# Patient Record
Sex: MALE | Race: WHITE | Employment: FULL TIME | URBAN - NONMETROPOLITAN AREA
[De-identification: names, ages, dates, MRNs, and addresses within clinical notes are randomized per-mention and may not be internally consistent; named-entity substitution may affect disease eponyms.]

---

## 2020-11-09 ENCOUNTER — OFFICE VISIT (OUTPATIENT)
Dept: FAMILY MEDICINE CLINIC | Age: 25
End: 2020-11-09
Payer: COMMERCIAL

## 2020-11-09 ENCOUNTER — TELEPHONE (OUTPATIENT)
Dept: FAMILY MEDICINE CLINIC | Age: 25
End: 2020-11-09

## 2020-11-09 ENCOUNTER — HOSPITAL ENCOUNTER (OUTPATIENT)
Dept: GENERAL RADIOLOGY | Age: 25
Discharge: HOME | End: 2020-11-09
Payer: COMMERCIAL

## 2020-11-09 ENCOUNTER — HOSPITAL ENCOUNTER (OUTPATIENT)
Age: 25
Discharge: HOME | End: 2020-11-09
Payer: COMMERCIAL

## 2020-11-09 VITALS
OXYGEN SATURATION: 99 % | TEMPERATURE: 98.1 F | HEIGHT: 70 IN | SYSTOLIC BLOOD PRESSURE: 128 MMHG | WEIGHT: 217.6 LBS | DIASTOLIC BLOOD PRESSURE: 88 MMHG | HEART RATE: 94 BPM | BODY MASS INDEX: 31.15 KG/M2

## 2020-11-09 PROCEDURE — 73030 X-RAY EXAM OF SHOULDER: CPT

## 2020-11-09 PROCEDURE — 71101 X-RAY EXAM UNILAT RIBS/CHEST: CPT

## 2020-11-09 PROCEDURE — 99203 OFFICE O/P NEW LOW 30 MIN: CPT | Performed by: NURSE PRACTITIONER

## 2020-11-09 RX ORDER — NAPROXEN 500 MG/1
500 TABLET ORAL 2 TIMES DAILY WITH MEALS
Qty: 60 TABLET | Refills: 3 | Status: SHIPPED | OUTPATIENT
Start: 2020-11-09 | End: 2021-01-15 | Stop reason: SINTOL

## 2020-11-09 RX ORDER — PREDNISONE 50 MG/1
50 TABLET ORAL DAILY
Qty: 5 TABLET | Refills: 0 | Status: SHIPPED | OUTPATIENT
Start: 2020-11-09 | End: 2020-11-14

## 2020-11-09 ASSESSMENT — PATIENT HEALTH QUESTIONNAIRE - PHQ9
SUM OF ALL RESPONSES TO PHQ QUESTIONS 1-9: 0
SUM OF ALL RESPONSES TO PHQ9 QUESTIONS 1 & 2: 0
2. FEELING DOWN, DEPRESSED OR HOPELESS: 0
1. LITTLE INTEREST OR PLEASURE IN DOING THINGS: 0

## 2020-11-09 ASSESSMENT — ENCOUNTER SYMPTOMS
DIARRHEA: 0
ORTHOPNEA: 0
BACK PAIN: 0
CHEST TIGHTNESS: 0
APNEA: 0
FACIAL SWELLING: 0
SORE THROAT: 0
ABDOMINAL DISTENTION: 0
EYE PAIN: 0
SHORTNESS OF BREATH: 1
EYE DISCHARGE: 0
NAUSEA: 0
PHOTOPHOBIA: 0
CONSTIPATION: 0

## 2020-11-09 NOTE — PROGRESS NOTES
[id number] [facility]  [address] [place] [id number]  Dept: [phone]  Dept Fax: : [phone]     Visit Date:  11/9/2020    Patient:  Eliana Mclaughlin  YOB: 1995    HPI:     Chief Complaint   Patient presents with    New Patient     Establish Care    Chest Pain     x 8 months, has seen a chiropractor with no relief. Chest Pain    This is a new problem. The current episode started more than 1 month ago (8 months ago). The onset quality is gradual. The problem occurs daily. The problem has been gradually worsening. The pain is present in the substernal region and lateral region (8 months). Pain scale: at rest at a 2; with movement or working 8. The pain is moderate. The quality of the pain is described as sharp, stabbing, tearing, burning and tightness. The pain radiates to the right arm and right shoulder (right arm dominent, threw with the right arm. ). Associated symptoms include shortness of breath ( due to pain). Pertinent negatives include no back pain, diaphoresis, dizziness, fever, headaches, nausea, numbness, orthopnea or weakness. Here to establish care  Used to see Dr Sreedhar Gonzalez  Hx of seasonal allergies  Takes claritin  Works at [employer] as a . No concussions  No fractures. Sports in highschool, played basketball and baseball  Continues to weight lift. No previous medical history. Normally feels well  Sleep is good  6-8 hours per nght. Medications    Current Outpatient Medications:     predniSONE (DELTASONE) 50 MG tablet, Take 1 tablet by mouth daily for 5 days, Disp: 5 tablet, Rfl: 0    naproxen (NAPROSYN) 500 MG tablet, Take 1 tablet by mouth 2 times daily (with meals), Disp: 60 tablet, Rfl: 3    The patient has No Known Allergies. Past Medical History  Lamberto  has no past medical history on file.     Past Surgical History  The patient  has a past surgical history that includes Tonsillectomy and Adenoidectomy. Family History  This patient's family history is not on file. Social History  Edith Sequeira  reports that he has never smoked. He has never used smokeless tobacco. He reports that he does not drink alcohol. Health Maintenance:    Health Maintenance   Topic Date Due    Varicella vaccine (1 of 2 - 2-dose childhood series) 12/31/1998    HPV vaccine (1 - Male 2-dose series) 12/31/2008    HIV screen  12/31/2012    Flu vaccine (1) 09/01/2020    DTaP/Tdap/Td vaccine (3 - Td) 07/29/2030    Hepatitis A vaccine  Aged Out    Hepatitis B vaccine  Aged Out    Hib vaccine  Aged Out    Meningococcal (ACWY) vaccine  Aged Out    Pneumococcal 0-64 years Vaccine  Aged Out       Subjective:      Review of Systems   Constitutional: Negative for activity change, appetite change, diaphoresis, fatigue and fever. HENT: Negative for congestion, facial swelling, postnasal drip and sore throat. Eyes: Negative for photophobia, pain, discharge and visual disturbance. Respiratory: Positive for shortness of breath ( due to pain). Negative for apnea and chest tightness. Cardiovascular: Positive for chest pain. Negative for orthopnea and leg swelling. Gastrointestinal: Negative for abdominal distention, constipation, diarrhea and nausea. Endocrine: Negative for cold intolerance. Genitourinary: Negative for dysuria, flank pain, frequency and urgency. Musculoskeletal: Negative for arthralgias, back pain and myalgias. Skin: Negative for pallor and rash. Allergic/Immunologic: Negative for environmental allergies and food allergies. Neurological: Negative for dizziness, weakness, numbness and headaches. Hematological: Negative for adenopathy. Does not bruise/bleed easily. Psychiatric/Behavioral: Negative for agitation, confusion and suicidal ideas.        Objective:     /88 (Site: Left Upper Arm, Position: Sitting, Cuff Size: Medium Adult)   Pulse 94   Temp 98.1 °F (36.7 °C) (Temporal)   Ht 5' 10.2\" (1.783 m)   Wt 217 lb 9.6 oz (98.7 kg)   SpO2 99%   BMI 31.04 kg/m²     Physical Exam  Vitals signs and nursing note reviewed. Constitutional:       General: He is not in acute distress. Appearance: Normal appearance. He is well-developed and normal weight. He is not ill-appearing. HENT:      Head: Normocephalic. Right Ear: Tympanic membrane, ear canal and external ear normal.      Left Ear: Tympanic membrane, ear canal and external ear normal.      Nose: Nose normal.      Mouth/Throat:      Mouth: Mucous membranes are moist.      Pharynx: No posterior oropharyngeal erythema. Eyes:      General: No scleral icterus. Right eye: No discharge. Left eye: No discharge. Conjunctiva/sclera: Conjunctivae normal.   Neck:      Musculoskeletal: Normal range of motion and neck supple. No muscular tenderness. Cardiovascular:      Rate and Rhythm: Normal rate and regular rhythm. Pulses: Normal pulses. Heart sounds: Normal heart sounds. Pulmonary:      Effort: Pulmonary effort is normal.      Breath sounds: Normal breath sounds. No wheezing or rales. Chest:      Chest wall: Deformity and tenderness present. No edema. There is no dullness to percussion. Breasts: Breasts are symmetrical.       Abdominal:      General: Bowel sounds are normal. There is no distension. Palpations: Abdomen is soft. Tenderness: There is no abdominal tenderness. Musculoskeletal: Normal range of motion. General: No tenderness. Lymphadenopathy:      Cervical: No cervical adenopathy. Skin:     General: Skin is warm and dry. Capillary Refill: Capillary refill takes less than 2 seconds. Findings: No erythema or rash. Neurological:      General: No focal deficit present. Mental Status: He is alert and oriented to person, place, and time. Mental status is at baseline.    Psychiatric:         Behavior: Behavior normal. Thought Content: Thought content normal.         Judgment: Judgment normal.         Labs Reviewed 11/9/2020:    No results found for: WBC, HGB, HCT, PLT, CHOL, TRIG, HDL, LDLDIRECT, ALT, AST, NA, K, CL, CREATININE, BUN, CO2, TSH, PSA, INR, GLUF, LABA1C, LABMICR    /Plan    I have reviewed the patient's medical history in detail and updated the computerized patient record. HPI? ROS per the patient. 8 month hx right sided chest pain after lifting a weight overhead. There is a right clavicular deformity. Pain mid chest right side, radiates up into the shoulder. Right distal end of clavicle appears to be mildly thickened. Pt will be started on anti inflammatories, gentle stretches given to the patient. And follow up in 4 weeks. May need a PT referral. Would suspect a cartilaginous injury/pleaurisy. Pt agreeable to plan of care. 1. Right-sided chest wall pain    - XR RIBS RIGHT INCLUDE CHEST (MIN 3 VIEWS); Future  - XR SHOULDER RIGHT (MIN 2 VIEWS); Future  - predniSONE (DELTASONE) 50 MG tablet; Take 1 tablet by mouth daily for 5 days  Dispense: 5 tablet; Refill: 0  - naproxen (NAPROSYN) 500 MG tablet; Take 1 tablet by mouth 2 times daily (with meals)  Dispense: 60 tablet; Refill: 3    2. Pain of right clavicle    - XR RIBS RIGHT INCLUDE CHEST (MIN 3 VIEWS); Future  - XR SHOULDER RIGHT (MIN 2 VIEWS); Future  - predniSONE (DELTASONE) 50 MG tablet; Take 1 tablet by mouth daily for 5 days  Dispense: 5 tablet; Refill: 0  - naproxen (NAPROSYN) 500 MG tablet; Take 1 tablet by mouth 2 times daily (with meals)  Dispense: 60 tablet; Refill: 3    3. Skin irritation from shaving        Return in about 6 months (around 5/9/2021), or if symptoms worsen or fail to improve, for Medication evaluation, Medication refill, Results review. Patient given educational materials - see patientinstructions. Discussed use, benefit, and side effects of prescribed medications. All patient questions answered. Pt voiced understanding.

## 2020-11-09 NOTE — PATIENT INSTRUCTIONS
Patient Education        Musculoskeletal Chest Pain: Care Instructions  Your Care Instructions     Chest pain is not always a sign that something is wrong with your heart or that you have another serious problem. The doctor thinks your chest pain is caused by strained muscles or ligaments, inflamed chest cartilage, or another problem in your chest, rather than by your heart. You may need more tests to find the cause of your chest pain. Follow-up care is a key part of your treatment and safety. Be sure to make and go to all appointments, and call your doctor if you are having problems. It's also a good idea to know your test results and keep a list of the medicines you take. How can you care for yourself at home? · Take pain medicines exactly as directed. ? If the doctor gave you a prescription medicine for pain, take it as prescribed. ? If you are not taking a prescription pain medicine, ask your doctor if you can take an over-the-counter medicine. · Rest and protect the sore area. · Stop, change, or take a break from any activity that may be causing your pain or soreness. · Put ice or a cold pack on the sore area for 10 to 20 minutes at a time. Try to do this every 1 to 2 hours for the next 3 days (when you are awake) or until the swelling goes down. Put a thin cloth between the ice and your skin. · After 2 or 3 days, apply a heating pad set on low or a warm cloth to the area that hurts. Some doctors suggest that you go back and forth between hot and cold. · Do not wrap or tape your ribs for support. This may cause you to take smaller breaths, which could increase your risk of lung problems. · Mentholated creams such as Bengay or Icy Hot may soothe sore muscles. Follow the instructions on the package. · Follow your doctor's instructions for exercising. · Gentle stretching and massage may help you get better faster.  Stretch slowly to the point just before pain begins, and hold the stretch for at least 15 to 30 seconds. Do this 3 or 4 times a day. Stretch just after you have applied heat. · As your pain gets better, slowly return to your normal activities. Any increased pain may be a sign that you need to rest a while longer. When should you call for help? Call 911 anytime you think you may need emergency care. For example, call if:    · You have chest pain or pressure. This may occur with:  ? Sweating. ? Shortness of breath. ? Nausea or vomiting. ? Pain that spreads from the chest to the neck, jaw, or one or both shoulders or arms. ? Dizziness or lightheadedness. ? A fast or uneven pulse. After calling 911, chew 1 adult-strength aspirin. Wait for an ambulance. Do not try to drive yourself.     · You have sudden chest pain and shortness of breath, or you cough up blood. Call your doctor now or seek immediate medical care if:    · You have any trouble breathing.     · Your chest pain gets worse.     · Your chest pain occurs consistently with exercise and is relieved by rest.   Watch closely for changes in your health, and be sure to contact your doctor if:    · Your chest pain does not get better after 1 week. Where can you learn more? Go to https://Z[employer] Corporation.LightSail Education. org and sign in to your [employer] Crowd account. Enter 4111 [id number] in the [facility] box to learn more about \"Musculoskeletal Chest Pain: Care Instructions. \"     If you do not have an account, please click on the \"Sign Up Now\" link. Current as of: June 26, 2019               Content Version: 12.6  © 7171-0050 [email or web address], Incorporated. Care instructions adapted under license by [facility] ([facility]). If you have questions about a medical condition or this instruction, always ask your healthcare professional. [place] [address] any warranty or liability for your use of this information.          Patient Education        Collarbone Fracture: Rehab Exercises  Introduction  Here are some examples of exercises for you to try. The exercises may be suggested for a condition or for rehabilitation. Start each exercise slowly. Ease off the exercises if you start to have pain. You will be told when to start these exercises and which ones will work best for you. How to do the exercises  Shoulder blade squeeze   1. While standing with your arms at your sides, squeeze your shoulder blades together. Do not raise your shoulders up as you are squeezing. 2. Hold 6 seconds. 3. Repeat 8 to 12 times. Wall angels   1. Start this exercise with your back against a wall and your hands raised above your head. 2. Keeping your arms against the wall, bend your elbows and slowly lower your arms while squeezing your shoulder blades together. 3. Repeat 8 to 12 times. Shoulder flexion (lying down)   To make a wand for this exercise, use a piece of PVC pipe or a broom handle with the broom removed. Make the wand about a foot wider than your shoulders. 1. Lie on your back, holding a wand with both hands. Your palms should face down as you hold the wand. 2. Keep your elbows straight, and slowly raise your arms over your head until you feel a stretch in your shoulders, upper back, and chest.  3. Hold for 15 to 30 seconds. 4. Repeat 2 to 4 times. Chest stretch (lying down)   1. Lie on your back with your elbows bent. Your arms should be out to your sides, and your arms and elbows should be resting on the surface you are lying on, such as the floor. 2. Raise your hands above your head until you feel a stretch in your chest.  3. Hold for 15 to 30 seconds. 4. Repeat 2 to 4 times. Follow-up care is a key part of your treatment and safety. Be sure to make and go to all appointments, and call your doctor if you are having problems. It's also a good idea to know your test results and keep a list of the medicines you take. Where can you learn more? Go to https://milton.Greencloud Technologies. org and sign in to your OpenSpark account.  Enter X100 in the Search Health Information box to learn more about \"Collarbone Fracture: Rehab Exercises. \"     If you do not have an account, please click on the \"Sign Up Now\" link. Current as of: March 2, 2020               Content Version: 12.6  © 6458-4616 [employer], Incorporated. Care instructions adapted under license by [facility] ([facility]). If you have questions about a medical condition or this instruction, always ask your healthcare professional. [address] any warranty or liability for your use of this information. Patient Education        Well Visit, Ages 25 to 48: Care Instructions  Your Care Instructions     Physical exams can help you stay healthy. Your doctor has checked your overall health and may have suggested ways to take good care of yourself. He or she also may have recommended tests. At home, you can help prevent illness with healthy eating, regular exercise, and other steps. Follow-up care is a key part of your treatment and safety. Be sure to make and go to all appointments, and call your doctor if you are having problems. It's also a good idea to know your test results and keep a list of the medicines you take. How can you care for yourself at home? · Reach and stay at a healthy weight. This will lower your risk for many problems, such as obesity, diabetes, heart disease, and high blood pressure. · Get at least 30 minutes of physical activity on most days of the week. Walking is a good choice. You also may want to do other activities, such as running, swimming, cycling, or playing tennis or team sports. Discuss any changes in your exercise program with your doctor. · Do not smoke or allow others to smoke around you. If you need help quitting, talk to your doctor about stop-smoking programs and medicines. These can increase your chances of quitting for good. · Talk to your doctor about whether you have any risk factors for sexually transmitted infections (STIs).  Having one sex partner (who does not have STIs and does not have sex with anyone else) is a good way to avoid these infections. · Use birth control if you do not want to have children at this time. Talk with your doctor about the choices available and what might be best for you. · Protect your skin from too much sun. When you're outdoors from 10 a.m. to 4 p.m., stay in the shade or cover up with clothing and a hat with a wide brim. Wear sunglasses that block UV rays. Even when it's cloudy, put broad-spectrum sunscreen (SPF 30 or higher) on any exposed skin. · See a dentist one or two times a year for checkups and to have your teeth cleaned. · Wear a seat belt in the car. Follow your doctor's advice about when to have certain tests. These tests can spot problems early. For everyone  · Cholesterol. Have the fat (cholesterol) in your blood tested after age 21. Your doctor will tell you how often to have this done based on your age, family history, or other things that can increase your risk for heart disease. · Blood pressure. Have your blood pressure checked during a routine doctor visit. Your doctor will tell you how often to check your blood pressure based on your age, your blood pressure results, and other factors. · Vision. Talk with your doctor about how often to have a glaucoma test.  · Diabetes. Ask your doctor whether you should have tests for diabetes. · Colon cancer. Your risk for colorectal cancer gets higher as you get older. Some experts say that adults should start regular screening at age 48 and stop at age 76. Others say to start before age 48 or continue after age 76. Talk with your doctor about your risk and when to start and stop screening. For women  · Breast exam and mammogram. Talk to your doctor about when you should have a clinical breast exam and a mammogram. Medical experts differ on whether and how often women under 50 should have these tests.  Your doctor can help you decide what is right for you.  · Cervical cancer screening test and pelvic exam. Begin with a Pap test at age 24. The test often is part of a pelvic exam. Starting at age 27, you may choose to have a Pap test, an HPV test, or both tests at the same time (called co-testing). Talk with your doctor about how often to have testing. · Tests for sexually transmitted infections (STIs). Ask whether you should have tests for STIs. You may be at risk if you have sex with more than one person, especially if your partners do not wear condoms. For men  · Tests for sexually transmitted infections (STIs). Ask whether you should have tests for STIs. You may be at risk if you have sex with more than one person, especially if you do not wear a condom. · Testicular cancer exam. Ask your doctor whether you should check your testicles regularly. · Prostate exam. Talk to your doctor about whether you should have a blood test (called a PSA test) for prostate cancer. Experts differ on whether and when men should have this test. Some experts suggest it if you are older than 39 and are -American or have a father or brother who got prostate cancer when he was younger than 72. When should you call for help? Watch closely for changes in your health, and be sure to contact your doctor if you have any problems or symptoms that concern you. Where can you learn more? Go to https://BioTrace Medical.healthJamii. org and sign in to your [employer] account. Enter P072 in the [facility] box to learn more about \"Well Visit, Ages 25 to 48: Care Instructions. \"     If you do not have an account, please click on the \"Sign Up Now\" link. Current as of: May 27, 2020               Content Version: 12.6  © 2469-1931 [employer], Incorporated. Care instructions adapted under license by [facility] [employer]station[employer] [facility] ([facility]).  If you have questions about a medical condition or this instruction, always ask your healthcare professional. Jigna Schwarz disclaims any warranty or liability for your use of this information.

## 2020-11-10 NOTE — TELEPHONE ENCOUNTER
I called the patient and received voicemail. I left a detailed message regarding Devika's response regarding his should and rib x rays and if he had any questions he can give the office a call back.

## 2021-01-13 ENCOUNTER — PATIENT MESSAGE (OUTPATIENT)
Dept: FAMILY MEDICINE CLINIC | Age: 26
End: 2021-01-13

## 2021-01-13 DIAGNOSIS — R06.02 SOB (SHORTNESS OF BREATH): ICD-10-CM

## 2021-01-13 DIAGNOSIS — R07.89 RIGHT-SIDED CHEST WALL PAIN: Primary | ICD-10-CM

## 2021-01-15 DIAGNOSIS — R07.1 CHEST PAIN MADE WORSE BY BREATHING: ICD-10-CM

## 2021-01-15 DIAGNOSIS — M89.8X1 PAIN OF RIGHT CLAVICLE: ICD-10-CM

## 2021-01-15 DIAGNOSIS — R07.89 RIGHT-SIDED CHEST WALL PAIN: Primary | ICD-10-CM

## 2021-01-15 NOTE — TELEPHONE ENCOUNTER
From: Ayde Cordon  To: JULIET Lewis - CNP  Sent: 1/13/2021 6:27 PM EST  Subject: Non-Urgent Medical Question    Hi Dr Venancio Enriquez been taking the naproxin and my chest continues to have pain daily. I have not been working out or doing anything outside of work and am seeing no improvement. Also the past couple weeks I have had a tougher time breathing, especially after eating. I have always had bad allergies but nothing like this before. I would like to get both taken care of so let me know what you think.  Thank you     Ashvin Kim

## 2021-01-16 ENCOUNTER — NURSE ONLY (OUTPATIENT)
Dept: LAB | Age: 26
End: 2021-01-16

## 2021-01-16 DIAGNOSIS — M89.8X1 PAIN OF RIGHT CLAVICLE: ICD-10-CM

## 2021-01-16 DIAGNOSIS — R07.89 RIGHT-SIDED CHEST WALL PAIN: ICD-10-CM

## 2021-01-16 DIAGNOSIS — R06.02 SOB (SHORTNESS OF BREATH): ICD-10-CM

## 2021-01-16 DIAGNOSIS — R07.1 CHEST PAIN MADE WORSE BY BREATHING: ICD-10-CM

## 2021-01-16 LAB
ALBUMIN SERPL-MCNC: 4.5 G/DL (ref 3.5–5.1)
ALP BLD-CCNC: 66 U/L (ref 38–126)
ALT SERPL-CCNC: 25 U/L (ref 11–66)
ANION GAP SERPL CALCULATED.3IONS-SCNC: 8 MEQ/L (ref 8–16)
AST SERPL-CCNC: 22 U/L (ref 5–40)
BASOPHILS # BLD: 0.4 %
BASOPHILS ABSOLUTE: 0 THOU/MM3 (ref 0–0.1)
BILIRUB SERPL-MCNC: 0.6 MG/DL (ref 0.3–1.2)
BUN BLDV-MCNC: 19 MG/DL (ref 7–22)
C-REACTIVE PROTEIN: 0.4 MG/DL (ref 0–1)
CALCIUM SERPL-MCNC: 9.5 MG/DL (ref 8.5–10.5)
CHLORIDE BLD-SCNC: 107 MEQ/L (ref 98–111)
CO2: 25 MEQ/L (ref 23–33)
CREAT SERPL-MCNC: 1.1 MG/DL (ref 0.4–1.2)
EOSINOPHIL # BLD: 6.4 %
EOSINOPHILS ABSOLUTE: 0.3 THOU/MM3 (ref 0–0.4)
ERYTHROCYTE [DISTWIDTH] IN BLOOD BY AUTOMATED COUNT: 11.9 % (ref 11.5–14.5)
ERYTHROCYTE [DISTWIDTH] IN BLOOD BY AUTOMATED COUNT: 39.5 FL (ref 35–45)
GFR SERPL CREATININE-BSD FRML MDRD: 83 ML/MIN/1.73M2
GLUCOSE BLD-MCNC: 87 MG/DL (ref 70–108)
HCT VFR BLD CALC: 46.4 % (ref 42–52)
HEMOGLOBIN: 16.1 GM/DL (ref 14–18)
IMMATURE GRANS (ABS): 0.01 THOU/MM3 (ref 0–0.07)
IMMATURE GRANULOCYTES: 0.2 %
LYMPHOCYTES # BLD: 45.8 %
LYMPHOCYTES ABSOLUTE: 2.1 THOU/MM3 (ref 1–4.8)
MCH RBC QN AUTO: 31.1 PG (ref 26–33)
MCHC RBC AUTO-ENTMCNC: 34.7 GM/DL (ref 32.2–35.5)
MCV RBC AUTO: 89.6 FL (ref 80–94)
MONOCYTES # BLD: 9 %
MONOCYTES ABSOLUTE: 0.4 THOU/MM3 (ref 0.4–1.3)
NUCLEATED RED BLOOD CELLS: 0 /100 WBC
PLATELET # BLD: 238 THOU/MM3 (ref 130–400)
PMV BLD AUTO: 10.4 FL (ref 9.4–12.4)
POTASSIUM SERPL-SCNC: 4.3 MEQ/L (ref 3.5–5.2)
RBC # BLD: 5.18 MILL/MM3 (ref 4.7–6.1)
SEDIMENTATION RATE, ERYTHROCYTE: 5 MM/HR (ref 0–10)
SEG NEUTROPHILS: 38.2 %
SEGMENTED NEUTROPHILS ABSOLUTE COUNT: 1.7 THOU/MM3 (ref 1.8–7.7)
SODIUM BLD-SCNC: 140 MEQ/L (ref 135–145)
TOTAL PROTEIN: 7.5 G/DL (ref 6.1–8)
TSH SERPL DL<=0.05 MIU/L-ACNC: 1.2 UIU/ML (ref 0.4–4.2)
WBC # BLD: 4.5 THOU/MM3 (ref 4.8–10.8)

## 2021-01-18 ENCOUNTER — TELEPHONE (OUTPATIENT)
Dept: FAMILY MEDICINE CLINIC | Age: 26
End: 2021-01-18

## 2021-01-18 NOTE — TELEPHONE ENCOUNTER
I would see how therapy does.  Insurance usually requires you try and fail at least 4 weeks of therapy before approving MRI>

## 2021-01-18 NOTE — TELEPHONE ENCOUNTER
----- Message from JULIET Mann CNP sent at 1/18/2021 10:33 AM EST -----  Labs are all within normal limits. Does appear he may have had some viral illness, but it was not covid. covid test was negative. Would suspect the chest pain is coming from a nerve impingement.

## 2021-01-18 NOTE — TELEPHONE ENCOUNTER
I called and spoke to the patient. I let him know Devika's response. The patient voiced understanding.

## 2021-01-21 ENCOUNTER — HOSPITAL ENCOUNTER (OUTPATIENT)
Dept: OCCUPATIONAL THERAPY | Age: 26
Setting detail: THERAPIES SERIES
Discharge: HOME | End: 2021-01-21
Payer: COMMERCIAL

## 2021-01-21 LAB — EPSTEIN BARR VIRUS BY PCR (BLOOD): NORMAL

## 2021-01-21 PROCEDURE — 97110 THERAPEUTIC EXERCISES: CPT

## 2021-01-21 PROCEDURE — 97165 OT EVAL LOW COMPLEX 30 MIN: CPT

## 2021-01-21 NOTE — PROGRESS NOTES
** PLEASE SIGN, DATE AND TIME CERTIFICATION BELOW AND RETURN TO [facility] OUTPATIENT REHABILITATION (FAX #: [phone]). ATTEST/CO-SIGN IF ACCESSING VIA [employer]. THANK YOU.**    I certify that I have examined the patient below and determined that Physical Medicine and Rehabilitation service is necessary and that I approve the established plan of care for up to 90 days or as specifically noted. Attestation, signature or co-signature of physician indicates approval of certification requirements.    ________________________ ____________ __________  Physician Signature   Date   Time   [address]  [x] EVALUATION  [] DAILY NOTE (LAND) [] DAILY NOTE (AQUATIC ) [] PROGRESS NOTE [] DISCHARGE NOTE    [x] 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 States right side of his chest, clavicle, and shoulder have been hurting for months. Stated he originally thought it was his ribs, so he went to he chiropractor for weeks but this did not help. Pt.  Then went to the doctor and xrays on 11/9/20 of his chest and right shoulder were normal. Pt. States he used to weight lift but has not done this in the past year due to his pain. Pt. 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 SUBJECTIVE: Pt. States his right shoulder/chest does not hurt all the time, but when it does, it's 7/10 pain. States it doesn't seem to be triggered by lifting. Points to middle of right pec muscle for primary pain. Social/Functional History:  Electronic Medical Record reviewed and up to date    Hussein Ferguson lives with spouse   Task Prior Level of Function  (current level of function addressed below)   ADLs  Independent   Ambulation Independent   Transfers Independent   Hobbies Exercise, work on his house   Driving Active    Work [employer]. Occupation: works for [employer]:  Hand Dominance right handed   Palpation Pt. Not tender over pec insertion. Observation Right scapula is slightly forward compared to left. Posture Right shoulder slightly forward   Edema    Special Tests        ADL's Pt. States he is able to do all his self care and work activities, but at times his right pec muscle will hurt when driving (arms out front and turning large steering wheel), not able to lift weights since this pain started   Bed Mobility     Transfers    Balance        Sensation Right Upper Extremity: Intact. Coordination WNL           RIGHT UPPER EXTREMITY  RANGE OF MOTION    AROM PROM COMMENTS         Shoulder Flexion 160  This is equal to left shoulder flexion   Shoulder Extension 50     Shoulder Abduction 160  Equal to left Abd. Shoulder Adduction      Shoulder External Rotation 75  Equal to left ER   Shoulder Internal Rotation Able to get thumb tip to inferior angle of scapula  Pt. Is able to get left thumb tip to mid scap level.    Shoulder Range of Motion is [place]  []      Elbow Flexion      Elbow Extension      Forearm Pronation      Forearm Supination Elbow Range of Motion is [place] [place]  [x]      Wrist Flexion      Wrist Extension      Wrist Radial Deviation      Wrist Ulnar Deviation      Wrist Range of Motion is [facility] [place]  [x]   If no measurement is recorded, no formal assessment was completed for that motion. RIGHT UPPER EXTREMITY  STRENGTH    Strength Rating Comments   Shoulder Flexion 5/5    Shoulder Extension 5/5    Shoulder Abduction     Shoulder Adduction     Shoulder External Rotation 4+/5    Shoulder Internal Rotation 4+/4    []  Shoulder Strength is grossly WFL. Elbow Flexion     Elbow Extension     Forearm Pronation     Forearm Supination     [x] Elbow Strength is grossly WFL. Wrist Flexion     Wrist Extension     Wrist Radial Deviation     Wrist Ulnar Deviation     [x]  Wrist Strength is grossly WFL. Right Left    Strength Setting:      Pinch Strength Tip Pinch:      Lateral Pinch           If no ratings are recorded, no formal assessment was completed.      TREATMENT   Precautions:    Pain:      X in shaded column indicates Activity Completed Today   Modalities Parameters/  Location  Notes/Comments                     Manual Therapy Time/  Technique  Notes/Comments                     Exercises   Sets/  Sec Reps  Notes/Comments   Supine over bolster for pec minor stretch  1 minutes  x    Corner stretch  15 sec 3 x    Doorway stretch with arms down and hands on door jam 15 sec 3 x    Towel stretch for IR 15 3 x                         Activities Time    Notes/Comments   Kinesiotaping to right pec muscle in Y strip for inhibition, 2 I strips anterior to posterior for mechanical corrections  x                  Specific Interventions Next Treatment: Ultrasound to right pec muscle at point of pain, anterior chest and thoracic stretching, posterior/periscapular strengthening, IASTM to pec muscle, kinesiotaping    Activity/Treatment Tolerance:  [x]  Patient tolerated treatment well  []  Patient limited by fatigue  []  Patient limited by pain   []  Patient limited by other medical complications  []  Other:     Assessment: Pt. Present with an 8 month history of right side pec and shoulder pain, unsure why. Xrays of right shoulder and chest were normal and pt. Did not respond to anti-inflammatories. IR of right shoulder is slightly decreased compared to left and right scapula is slightly forward compared to left. Pt. Would benefit from skilled OT to address soft tissue tightness, stretching, posterior shoulder strength, and modalities as needed. Areas for Improvement: impaired ROM, impaired strength and pain  Prognosis: good    GOALS:  Patient Goal: To not have pain in right chest when driving and working    Short Term Goals:  Time Frame: 4 weeks  1. Pt. Will demo independence with stretches and posterior shoulder strengthening HEP for decreased pain in right side of chest  2. Pt. Will demo improved IR of right shoulder equal to left shoulder to thumb tip to mid scap level        Long Term Goals:  Time Frame: 6 weeks  1. Pt. Will report no episodes of pain in right side of chest/pec muscle while working  2. Pt. Will demo equal positioning of right scapula to left scapula for proper mechanics of shoulder with motion and lifting   3. Pt. Will demo 5/5 strength for IR/ER of right shoulder for work tasks at [address] [place] [address]      Patient Education:   [x]  HEP/Education Completed: Plan of Care, Goals, purpose and care of kinesiotape   [address] for HEP: pec stretch over bolster/towel roll, corner stretch, doorway stretch, towel stretch for IR  []  No new Education completed  []  Reviewed Prior HEP      [x]  Patient verbalized and/or demonstrated understanding of education provided. []  Patient unable to verbalize and/or demonstrate understanding of education provided. Will continue education.   [x]  Barriers to learning: none    PLAN:  Treatment Recommendations: Strengthening, Range of Motion, Manual Therapy - Soft Tissue Mobilization, Home Exercise Program and Modalities    [x]  Plan of care initiated. Plan to see patient 2 times per week for 6 weeks to address the treatment planned outlined above. []  Continue with current plan of care  []  Modify plan of care as follows:    []  Hold pending physician visit  []  Discharge    Time In 722   Time Out 805       Timed Code Minutes: Minutes Units   ADL ([id number])     Aquatics (84569)     Manual Therapy (45366)     Neuro (49397)     Th. Activities (13217)     Th. Exercise (79884) 10    Wheelchair Mgmt (90335)     Cognitive Function (1st 15 min - 34614)     Cognitive Function (per sub.  15 min - 64157)     Ultrasound (42912)     Ionto (17961)     Manual E-Stim (04028)          TOTAL TREATMENT TIME: 43 minutes       Electronically Signed by: OLIVER Nelson/SHANEKA [id number]

## 2021-01-25 LAB — COXSACKIE B PANEL: NORMAL

## 2021-01-26 ENCOUNTER — HOSPITAL ENCOUNTER (OUTPATIENT)
Dept: OCCUPATIONAL THERAPY | Age: 26
Setting detail: THERAPIES SERIES
Discharge: HOME | End: 2021-01-26
Payer: COMMERCIAL

## 2021-01-26 PROCEDURE — 97110 THERAPEUTIC EXERCISES: CPT

## 2021-01-26 PROCEDURE — 97035 APP MDLTY 1+ULTRASOUND EA 15: CPT

## 2021-01-26 NOTE — PROGRESS NOTES
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 States right side of his chest, clavicle, and shoulder have been hurting for months. Stated he originally thought it was his ribs, so he went to he chiropractor for weeks but this did not help. Pt. Then went to the doctor and xrays on 20 of his chest and right shoulder were normal. Pt. States he used to weight lift but has not done this in the past year due to his pain. Pt. Also works for [employer] 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        SUBJECTIVE: Pt. States he is not having pain in chest this morning but tightness        TREATMENT   Precautions:    Pain: no pain just tightness in right pec     X in shaded column indicates Activity Completed Today   Modalities Parameters/  Location  Notes/Comments   Ultrasound to right pec 1MHz continuous, 1.2 hale/cm2  x                Manual Therapy Time/  Technique  Notes/Comments   IASTM to right pec out to insertion  x                Exercises   Sets/  Sec Reps  Notes/Comments   Supine over bolster with hands behind head for pec minor stretch  1 minute 2 x    Supine over horizontal bolster for thoracic extension 30 sec 1 x    Corner stretch  15 sec 3     Doorway stretch with arms down and hands on door jam 15 sec 3     Towel stretch for IR 15 3     Prone I, T, Y  1 set 10 ea x                  Activities Time    Notes/Comments   reapplie Kinesiotap2 to right pec muscle in Y strip for inhibition  x                  Specific Interventions Next Treatment: Ultrasound to right pec muscle at point of pain, anterior chest and thoracic stretching, posterior/periscapular strengthening, IASTM to pec muscle, kinesiotaping    Activity/Treatment Tolerance:  [x]  Patient tolerated treatment well  []  Patient limited by fatigue  []  Patient limited by pain   []  Patient limited by other medical complications  []  Other:     Assessment: today was first full treatment session after eval. Pt. Tolerated well. Areas for Improvement: impaired ROM, impaired strength and pain  Prognosis: good    GOALS:  Patient Goal: To not have pain in right chest when driving and working    Short Term Goals:  Time Frame: 4 weeks  1. Pt. Will demo independence with stretches and posterior shoulder strengthening HEP for decreased pain in right side of chest  2. Pt. Will demo improved IR of right shoulder equal to left shoulder to thumb tip to mid scap level        Long Term Goals:  Time Frame: 6 weeks  1. Pt. Will report no episodes of pain in right side of chest/pec muscle while working  2. Pt. Will demo equal positioning of right scapula to left scapula for proper mechanics of shoulder with motion and lifting   3. Pt.  Will demo 5/5 strength for IR/ER of right shoulder for work tasks at [address] [place] [address]      Patient Education:   [x]  HEP/Education Completed: Plan of Care, Goals, purpose and care of

## 2021-01-28 ENCOUNTER — HOSPITAL ENCOUNTER (OUTPATIENT)
Dept: OCCUPATIONAL THERAPY | Age: 26
Setting detail: THERAPIES SERIES
Discharge: HOME | End: 2021-01-28
Payer: COMMERCIAL

## 2021-01-28 PROCEDURE — 97110 THERAPEUTIC EXERCISES: CPT

## 2021-01-28 PROCEDURE — 97035 APP MDLTY 1+ULTRASOUND EA 15: CPT

## 2021-01-28 NOTE — PROGRESS NOTES
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 States right side of his chest, clavicle, and shoulder have been hurting for months. Stated he originally thought it was his ribs, so he went to he chiropractor for weeks but this did not help. Pt. Then went to the doctor and xrays on 20 of his chest and right shoulder were normal. Pt. States he used to weight lift but has not done this in the past year due to his pain. Pt. 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 SUBJECTIVE: Pt. States chest was sore after last session however this subsided in 24 hours. States he has not had anymore episodes of grabbing pain in right pec since starting therapy.          TREATMENT   Precautions:    Pain: no pain just tightness in right pec     X in shaded column indicates Activity Completed Today   Modalities Parameters/  Location  Notes/Comments   Ultrasound to right pec 1MHz continuous, 1.2 hale/cm2  X                Manual Therapy Time/  Technique  Notes/Comments   IASTM to right pec out to insertion  X                Exercises   Sets/  Sec Reps  Notes/Comments   Supine over bolster with hands behind head for pec minor stretch  1 minute 2 X    Supine over horizontal bolster for thoracic extension 30 sec 1 X    Corner stretch  15 sec 3     Doorway stretch with arms down and hands on door jam 15 sec 3     Towel stretch for IR 15 3     Prone  T, Y with 3 sec. holds 1 set 10 ea X    Prone ball lifts with 3 sec holds 1 set 15 X    Prone rows 6# 1 set 15 X    Prone Houghstons with 2# 1 set 15 ea X    Blue theraband for sandra. ER arms at side 1 set 15 X    biodex 55 speed x5 min. backwards   X           Activities Time    Notes/Comments   Kinesiotape to right shoulder 2 I strips anterior to posterior for mechanical correction  X                  Specific Interventions Next Treatment: Ultrasound to right pec muscle at point of pain, anterior chest and thoracic stretching, posterior/periscapular strengthening, IASTM to pec muscle, kinesiotaping    Activity/Treatment Tolerance:  [x]  Patient tolerated treatment well  []  Patient limited by fatigue  []  Patient limited by pain   []  Patient limited by other medical complications  []  Other:     Assessment: Pt. Tolerated well. Progressing toward goals. Reports no more episodes of grabbing pain in right side of chest/pec  Areas for Improvement: impaired ROM, impaired strength and pain  Prognosis: good    GOALS:  Patient Goal: To not have pain in right chest when driving and working    Short Term Goals:  Time Frame: 4 weeks  1. Pt. Will demo independence with stretches and posterior shoulder strengthening HEP for decreased pain in right side of chest  2.  Pt. Will demo improved IR of right shoulder equal to left shoulder to thumb tip to mid scap level        Long Term Goals:  Time Frame: 6 weeks  1. Pt. Will report no episodes of pain in right side of chest/pec muscle while working  2. Pt. Will demo equal positioning of right scapula to left scapula for proper mechanics of shoulder with motion and lifting   3. Pt. Will demo 5/5 strength for IR/ER of right shoulder for work tasks at [address] [facility]      Patient Education:   []  HEP/Education Completed: Plan of Care, Goals, purpose and care of kinesiotape   [address] for HEP: pec stretch over bolster/towel roll, corner stretch, doorway stretch, towel stretch for IR  [x]  No new Education completed  []  Reviewed purpose of ultrasound       []  Patient verbalized and/or demonstrated understanding of education provided. []  Patient unable to verbalize and/or demonstrate understanding of education provided. Will continue education. [x]  Barriers to learning: none    PLAN:  Treatment Recommendations: Strengthening, Range of Motion, Manual Therapy - Soft Tissue Mobilization, Home Exercise Program and Modalities    []  Plan of care initiated. Plan to see patient 2 times per week for 6 weeks to address the treatment planned outlined above. [x]  Continue with current plan of care  []  Modify plan of care as follows:    []  Hold pending physician visit  []  Discharge    Time In 700   Time Out 740       Timed Code Minutes: Minutes Units   ADL (76311)     Aquatics (12735)     Manual Therapy (49218)     Neuro (58056)     Th. Activities (48779)     Th. Exercise (35355) 32 2   Wheelchair Mgmt (31042)     Cognitive Function (1st 15 min - 86752)     Cognitive Function (per sub.  15 min - 51800)     Ultrasound (60407) 8 1   Ionto (57174)     Manual E-Stim (08638)          TOTAL TREATMENT TIME: 40 minutes       Electronically Signed by: OLIVER Garcia/SHANEKA [id number]

## 2021-02-02 ENCOUNTER — HOSPITAL ENCOUNTER (OUTPATIENT)
Dept: OCCUPATIONAL THERAPY | Age: 26
Setting detail: THERAPIES SERIES
Discharge: HOME | End: 2021-02-02
Payer: COMMERCIAL

## 2021-02-02 PROCEDURE — 97110 THERAPEUTIC EXERCISES: CPT

## 2021-02-02 PROCEDURE — 97035 APP MDLTY 1+ULTRASOUND EA 15: CPT

## 2021-02-02 NOTE — PROGRESS NOTES
Notes/Comments   IASTM to right pec out to insertion  Xx                Exercises   Sets/  Sec Reps  Notes/Comments   Supine over bolster with hands behind head for pec minor stretch  1 minute 2 Xx    Supine over horizontal bolster for thoracic extension 1 min  1 Xx    Corner stretch  15 sec 3     Doorway stretch with arms down and hands on door jam 15 sec 3 Xx    Towel stretch for IR 15 3     Prone  T, Y with 3 sec. holds 1 set 10 ea     Prone ball lifts with 3 sec holds 1 set 15 Xx    Prone rows 6# 1 set 15 Xx    Prone Houghstons with 2# 1 set 15 ea Xx    Blue theraband for sandra. ER arms at side 1 set 15 Xx    biodex 55 speed x5 min. backwards   Xx           Activities Time    Notes/Comments   Kinesiotape to right shoulder 2 I strips anterior to posterior for mechanical correction                    Specific Interventions Next Treatment: Ultrasound to right pec muscle at point of pain, anterior chest and thoracic stretching, posterior/periscapular strengthening, IASTM to pec muscle, kinesiotaping    Activity/Treatment Tolerance:  [x]  Patient tolerated treatment well  []  Patient limited by fatigue  []  Patient limited by pain   []  Patient limited by other medical complications  []  Other:     Assessment: Pt. Tolerated well. Progressing slowly toward goals - still reporting tightness in pec. Had pt. Internally rotate RUE behind back and attempt to lift hand off back and pt. demo'd significant difficulty. Areas for Improvement: impaired ROM, impaired strength and pain  Prognosis: good    GOALS:  Patient Goal: To not have pain in right chest when driving and working    Short Term Goals:  Time Frame: 4 weeks  1. Pt. Will demo independence with stretches and posterior shoulder strengthening HEP for decreased pain in right side of chest  2. Pt. Will demo improved IR of right shoulder equal to left shoulder to thumb tip to mid scap level        Long Term Goals:  Time Frame: 6 weeks  1. Pt.  Will report no episodes of pain in right side of chest/pec muscle while working  2. Pt. Will demo equal positioning of right scapula to left scapula for proper mechanics of shoulder with motion and lifting   3. Pt. Will demo 5/5 strength for IR/ER of right shoulder for work tasks at [address] [facility]      Patient Education:   []  HEP/Education Completed: Plan of Care, Goals, purpose and care of kinesiotape   [address] for HEP: pec stretch over bolster/towel roll, corner stretch, doorway stretch, towel stretch for IR  []  No new Education completed  [x]  Discussed with pt. Giving therapy another week and if he is still not feeling much difference to then call for follow up visit with doctor     [x]  Patient verbalized and/or demonstrated understanding of education provided. []  Patient unable to verbalize and/or demonstrate understanding of education provided. Will continue education. [x]  Barriers to learning: none    PLAN:  Treatment Recommendations: Strengthening, Range of Motion, Manual Therapy - Soft Tissue Mobilization, Home Exercise Program and Modalities    []  Plan of care initiated. Plan to see patient 2 times per week for 6 weeks to address the treatment planned outlined above. [x]  Continue with current plan of care  []  Modify plan of care as follows:    []  Hold pending physician visit  []  Discharge    Time In 730   Time Out 810       Timed Code Minutes: Minutes Units   ADL (17283)     Aquatics (68902)     Manual Therapy (32045)     Neuro (03446)     Th. Activities (18939)     Th. Exercise (20568) 32 2   Wheelchair Mgmt (91731)     Cognitive Function (1st 15 min - 47016)     Cognitive Function (per sub.  15 min - 90591)     Ultrasound (16941) 8 1   Ionto (24146)     Manual E-Stim (93155)          TOTAL TREATMENT TIME: 40 minutes       Electronically Signed by: OLIVER Velásquez/SHANEKA [id number]

## 2021-02-04 ENCOUNTER — HOSPITAL ENCOUNTER (OUTPATIENT)
Dept: OCCUPATIONAL THERAPY | Age: 26
Setting detail: THERAPIES SERIES
Discharge: HOME | End: 2021-02-04
Payer: COMMERCIAL

## 2021-02-04 PROCEDURE — 97110 THERAPEUTIC EXERCISES: CPT

## 2021-02-04 PROCEDURE — 97035 APP MDLTY 1+ULTRASOUND EA 15: CPT

## 2021-02-04 NOTE — PROGRESS NOTES
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 States right side of his chest, clavicle, and shoulder have been hurting for months. Stated he originally thought it was his ribs, so he went to he chiropractor for weeks but this did not help. Pt. Then went to the doctor and xrays on 20 of his chest and right shoulder were normal. Pt. States he used to weight lift but has not done this in the past year due to his pain. Pt. Also works for [employer] and is driving and steering large steering wheel and lifting heavy boxes. SUBJECTIVE: Pt. States right side of chest/pec is sore today - reports increased soreness after last visit.          TREATMENT   Precautions:    Pain: 4/10 pain     X in shaded column indicates Activity Completed Today   Modalities Parameters/  Location  Notes/Comments   Ultrasound to right pec 1MHz continuous, 1.2 hale/cm2  X                Manual Therapy Time/  Technique  Notes/Comments   IASTM to right pec out to insertion  X                Exercises   Sets/  Sec Reps  Notes/Comments   Supine over bolster with hands behind head for pec minor stretch  1 minute 2 X    Supine over horizontal small bolster for thoracic extension 1 min  1 X    Corner stretch  15 sec 3     Doorway stretch with arms down and hands on door jam 15 sec 3     Towel stretch for IR 15 5 X    Prone  T, Y with 3 sec. holds 1 set; 10 ea X    Prone ball lifts with 3 sec holds 1 set 15  Did not complete Houghstons today due to increased pain   Prone rows 6# 1 set 15     Prone Houghstons with 2# 1 set 15 ea     Blue theraband for sandra. ER arms at side 1 set 15     biodex 55 speed x5 min. backwards   X    elbows pressing down onto mat while seated 10 sec 5 X                         Activities Time    Notes/Comments   Kinesiotape to right shoulder 2 I strips anterior to posterior for mechanical correction                    Specific Interventions Next Treatment: Ultrasound to right pec muscle at point of pain, anterior chest and thoracic stretching, posterior/periscapular strengthening, IASTM to pec muscle, kinesiotaping    Activity/Treatment Tolerance:  [x]  Patient tolerated treatment well  []  Patient limited by fatigue  []  Patient limited by pain   []  Patient limited by other medical complications  []  Other:     Assessment: Pt. Tolerated well. Progressing slowly toward goals - discussed with pt. Completing a progress note at next session which will be 3 weeks and having pt. Call for recheck with doctor to see if an MRI could be approved due to continued pain and positive lift off test with RUE   Areas for Improvement: impaired ROM, impaired strength and pain  Prognosis: good    GOALS:  Patient Goal: To not have pain in right chest when driving and working    Short Term Goals:  Time Frame: 4 weeks  1. Pt.  Will demo independence with stretches and posterior shoulder strengthening HEP for decreased pain in right side of chest  2. Pt. Will demo improved IR of right shoulder equal to left shoulder to thumb tip to mid scap level        Long Term Goals:  Time Frame: 6 weeks  1. Pt. Will report no episodes of pain in right side of chest/pec muscle while working  2. Pt. Will demo equal positioning of right scapula to left scapula for proper mechanics of shoulder with motion and lifting   3. Pt. Will demo 5/5 strength for IR/ER of right shoulder for work tasks at [address] [facility]      Patient Education:   []  HEP/Education Completed: Plan of Care, Goals, purpose and care of kinesiotape   [address] for HEP: pec stretch over bolster/towel roll, corner stretch, doorway stretch, towel stretch for IR  []  No new Education completed  [x]  Discussed POC     [x]  Patient verbalized and/or demonstrated understanding of education provided. []  Patient unable to verbalize and/or demonstrate understanding of education provided. Will continue education. [x]  Barriers to learning: none    PLAN:  Treatment Recommendations: Strengthening, Range of Motion, Manual Therapy - Soft Tissue Mobilization, Home Exercise Program and Modalities    []  Plan of care initiated. Plan to see patient 2 times per week for 6 weeks to address the treatment planned outlined above. [x]  Continue with current plan of care  []  Modify plan of care as follows:    []  Hold pending physician visit  []  Discharge    Time In 700   Time Out 745       Timed Code Minutes: Minutes Units   ADL (94116)     Aquatics (57757)     Manual Therapy (78813)     Neuro (78042)     Th. Activities (21100)     Th. Exercise (86482) 37 2   Wheelchair Mgmt (89100)     Cognitive Function (1st 15 min - 41266)     Cognitive Function (per sub.  15 min - 27172)     Ultrasound (66251) 8 1   Ionto (02634)     Manual E-Stim (06517)          TOTAL TREATMENT TIME: 45 minutes       Electronically Signed by: OLIVER Rodrigues/SHANEKA [id number]

## 2021-02-05 DIAGNOSIS — R07.89 RIGHT-SIDED CHEST WALL PAIN: ICD-10-CM

## 2021-02-05 DIAGNOSIS — M89.8X1 PAIN OF RIGHT CLAVICLE: ICD-10-CM

## 2021-02-05 DIAGNOSIS — M19.011 ARTHROPATHY OF RIGHT SHOULDER: Primary | ICD-10-CM

## 2021-02-09 ENCOUNTER — HOSPITAL ENCOUNTER (OUTPATIENT)
Dept: OCCUPATIONAL THERAPY | Age: 26
Setting detail: THERAPIES SERIES
Discharge: HOME | End: 2021-02-09
Payer: COMMERCIAL

## 2021-02-09 PROCEDURE — 97168 OT RE-EVAL EST PLAN CARE: CPT

## 2021-02-09 NOTE — PROGRESS NOTES
[address]  [] EVALUATION  [] DAILY NOTE (LAND) [] DAILY NOTE (AQUATIC ) [x] PROGRESS NOTE [] DISCHARGE NOTE    [x] OUTPATIENT REHABILITATION [facility]   [] [place] [address]    [] 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 States right side of his chest, clavicle, and shoulder have been hurting for months. Stated he originally thought it was his ribs, so he went to he chiropractor for weeks but this did not help. Pt. Then went to the doctor and xrays on 20 of his chest and right shoulder were normal. Pt. States he used to weight lift but has not done this in the past year due to his pain. Pt. Also works for [employer] and is driving and steering large steering wheel and lifting heavy boxes. SUBJECTIVE: Pt. States right side pec muscle has been painful over the weekend. States he called his doctor and she has ordered an MRI.          TREATMENT   Precautions:    Pain: 4/10 pain     X in shaded column indicates Activity Completed Today   Modalities Parameters/  Location  Notes/Comments   Ultrasound to right pec 1MHz continuous, 1.2 hale/cm2 Areas for Improvement: impaired ROM, impaired strength and pain  Prognosis: good    GOALS:  Patient Goal: To not have pain in right chest when driving and working    Short Term Goals:  Time Frame: 4 weeks  1. Pt. Will demo independence with stretches and posterior shoulder strengthening HEP for decreased pain in right side of chest - GOAL MET 2/9/21 WITH PT. INDEPENDENT WITH TOWEL STRETCH FOR IR, PEC MINOR STRETCH OVER TOWEL ROLL, CORNER STRETCHES  2. Pt. Will demo improved IR of right shoulder equal to left shoulder to thumb tip to mid scap level - GOAL NOT MET 2/9/21 WITH RIGHT UE A HAND WIDTH BELOW LEFT FOR IR BEHIND BACK        Long Term Goals:  Time Frame: 6 weeks  1. Pt. Will report no episodes of pain in right side of chest/pec muscle while working - GOAL NOT MET 2/9/21 WITH CONTINUED PAIN IN RIGHT PEC AREA  2. Pt. Will demo equal positioning of right scapula to left scapula for proper mechanics of shoulder with motion and lifting - GOAL NOT MET 2/9/21 WITH RIGHT SCAPULA REMAINING FORWARD COMPARED TO LEFT  3. Pt. Will demo 5/5 strength for IR/ER of right shoulder for work tasks at [address] ER AT 4/5      Patient Education:   []  HEP/Education Completed: Plan of Care, Goals, purpose and care of mary Gonzalez for HEP: pec stretch over bolster/towel roll, corner stretch, doorway stretch, towel stretch for IR  []  No new Education completed  [x]  Discussed POC and decision to put therapy on hold until results of MRI due to continued symptoms     [x]  Patient verbalized and/or demonstrated understanding of education provided. []  Patient unable to verbalize and/or demonstrate understanding of education provided. Will continue education. [x]  Barriers to learning: none    PLAN:  Treatment Recommendations: Strengthening, Range of Motion, Manual Therapy - Soft Tissue Mobilization, Home Exercise Program and Modalities    []  Plan of care initiated.   Plan to see patient 2 times per week for 6 weeks to address the treatment planned outlined above. []  Continue with current plan of care  []  Modify plan of care as follows:    [x]  Hold pending physician visit/MRI results  []  Discharge    Time In 700   Time Out 710       Timed Code Minutes: Minutes Units   ADL (03205)     Aquatics (68271)     Manual Therapy (22675)     Neuro (46541)     Th. Activities (70152)     Th. Exercise (52517)     Wheelchair Mgmt (95021)     Cognitive Function (1st 15 min - 46474)     Cognitive Function (per sub.  15 min - 54323)     Ultrasound (71103)     Ionto (26441)     Manual E-Stim (72783)          TOTAL TREATMENT TIME: 10 minutes       Electronically Signed by: OLIVER Sebastian/SHANEKA [id number]

## 2021-02-11 ENCOUNTER — APPOINTMENT (OUTPATIENT)
Dept: OCCUPATIONAL THERAPY | Age: 26
End: 2021-02-11
Payer: COMMERCIAL

## 2021-02-16 ENCOUNTER — APPOINTMENT (OUTPATIENT)
Dept: OCCUPATIONAL THERAPY | Age: 26
End: 2021-02-16
Payer: COMMERCIAL

## 2021-02-17 LAB — SARS-COV-2 ANTIBODY, TOTAL: NEGATIVE

## 2021-02-18 ENCOUNTER — APPOINTMENT (OUTPATIENT)
Dept: OCCUPATIONAL THERAPY | Age: 26
End: 2021-02-18
Payer: COMMERCIAL

## 2021-03-04 NOTE — DISCHARGE SUMMARY
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 States right side of his chest, clavicle, and shoulder have been hurting for months. Stated he originally thought it was his ribs, so he went to he chiropractor for weeks but this did not help. Pt. Then went to the doctor and xrays on 20 of his chest and right shoulder were normal. Pt. States he used to weight lift but has not done this in the past year due to his pain. Pt. Also works for [employer] and is driving and steering large steering wheel and lifting heavy boxes. SUBJECTIVE: Pt. States right side pec muscle has been painful over the weekend. States he called his doctor and she has ordered an MRI.          TREATMENT   Precautions:    Pain: 4/10 pain     X in shaded column indicates Activity Completed Today   Modalities Parameters/  Location  Notes/Comments   Ultrasound to right pec 1MHz continuous, 1.2 hale/cm2                  Manual Therapy Time/  Technique  Notes/Comments   IASTM to right pec out to insertion                  Exercises   Sets/  Sec Reps  Notes/Comments   Supine over bolster with hands behind head for pec minor stretch  1 minute 2     Supine over horizontal small bolster for thoracic extension 1 min  1     Corner stretch  15 sec 3     Doorway stretch with arms down and hands on door jam 15 sec 3     Towel stretch for IR 15 5     Prone  T, Y with 3 sec. holds 1 set; 10 ea     Prone ball lifts with 3 sec holds 1 set 15  Did not complete Houghstons today due to increased pain   Prone rows 6# 1 set 15     Prone Houghstons with 2# 1 set 15 ea     Blue theraband for sandra. ER arms at side 1 set 15     biodex 55 speed x5 min. backwards       elbows pressing down onto mat while seated 10 sec 5                          Activities Time    Notes/Comments   Kinesiotape to right shoulder 2 I strips anterior to posterior for mechanical correction                    Specific Interventions Next Treatment: Ultrasound to right pec muscle at point of pain, anterior chest and thoracic stretching, posterior/periscapular strengthening, IASTM to pec muscle, kinesiotaping    Activity/Treatment Tolerance:  [x]  Patient tolerated treatment well  []  Patient limited by fatigue  []  Patient limited by pain   []  Patient limited by other medical complications  []  Other:     Assessment: Pt. Has been seen a total of 3 weeks in OT for right side pectoral/anterior shoulder pain. Pt. Has received ultrasound, STM, stretching, and periscapular strengthening. Pt. States that his symptoms are no better and that he continues to have episodes of significant pain during work activities. Pt. Does demo a positive lift off test and mild weakness of ER. Right scapula is forward compared to left. Pt's physician has ordered an MRI due to continues symptoms and pt.  Is in agreement to placing therapy on hold until results of MRI can be determined. Areas for Improvement: impaired ROM, impaired strength and pain  Prognosis: good    GOALS:  Patient Goal: To not have pain in right chest when driving and working    Short Term Goals:  Time Frame: 4 weeks  1. Pt. Will demo independence with stretches and posterior shoulder strengthening HEP for decreased pain in right side of chest - GOAL MET 2/9/21 WITH PT. INDEPENDENT WITH TOWEL STRETCH FOR IR, PEC MINOR STRETCH OVER TOWEL ROLL, CORNER STRETCHES  2. Pt. Will demo improved IR of right shoulder equal to left shoulder to thumb tip to mid scap level - GOAL NOT MET 2/9/21 WITH RIGHT UE A HAND WIDTH BELOW LEFT FOR IR BEHIND BACK        Long Term Goals:  Time Frame: 6 weeks  1. Pt. Will report no episodes of pain in right side of chest/pec muscle while working - GOAL NOT MET 2/9/21 WITH CONTINUED PAIN IN RIGHT PEC AREA  2. Pt. Will demo equal positioning of right scapula to left scapula for proper mechanics of shoulder with motion and lifting - GOAL NOT MET 2/9/21 WITH RIGHT SCAPULA REMAINING FORWARD COMPARED TO LEFT  3. Pt. Will demo 5/5 strength for IR/ER of right shoulder for work tasks at [address] ER AT 4/5      Patient Education:   []  HEP/Education Completed: Plan of Care, Goals, purpose and care of kinesiotape   [address] for HEP: pec stretch over bolster/towel roll, corner stretch, doorway stretch, towel stretch for IR  []  No new Education completed  [x]  Discussed POC and decision to put therapy on hold until results of MRI due to continued symptoms     [x]  Patient verbalized and/or demonstrated understanding of education provided. []  Patient unable to verbalize and/or demonstrate understanding of education provided. Will continue education. [x]  Barriers to learning: none    PLAN:  Treatment Recommendations: Strengthening, Range of Motion, Manual Therapy - Soft Tissue Mobilization, Home Exercise Program and Modalities    []  Plan of care initiated.   Plan to see patient 2

## 2021-03-18 ENCOUNTER — TELEPHONE (OUTPATIENT)
Dept: FAMILY MEDICINE CLINIC | Age: 26
End: 2021-03-18

## 2021-03-18 DIAGNOSIS — M19.011 ARTHROPATHY OF RIGHT SHOULDER: Primary | ICD-10-CM

## 2021-03-18 DIAGNOSIS — M89.8X1 PAIN OF RIGHT CLAVICLE: ICD-10-CM

## 2021-03-18 DIAGNOSIS — R07.89 RIGHT-SIDED CHEST WALL PAIN: ICD-10-CM

## 2021-03-18 NOTE — TELEPHONE ENCOUNTER
MMO is denying MRI Shoulder, all available clinical has been submitted  Peer to peer is available  # [id number]  Case# [id number]     Please advise.

## 2021-03-19 NOTE — TELEPHONE ENCOUNTER
I faxed the referral to the referring physician's office along with a Referral Cover Sheet and received a fax confirmation back.

## 2021-03-19 NOTE — TELEPHONE ENCOUNTER
I called and spoke to the patient. I let him know Devika's response regarding the MRI Shoulder being denied. The patient voiced understanding and would like Paula Guillaume to make the referral to [facility]. I did let him know that they will be calling him to schedule. He also said that he has an MRI Shoulder scheduled for 03- at [facility]. Tess's. I told him that I would cancel that for him.

## 2022-11-08 ENCOUNTER — OFFICE VISIT (OUTPATIENT)
Dept: FAMILY MEDICINE CLINIC | Age: 27
End: 2022-11-08
Payer: COMMERCIAL

## 2022-11-08 ENCOUNTER — NURSE ONLY (OUTPATIENT)
Dept: LAB | Age: 27
End: 2022-11-08

## 2022-11-08 VITALS
DIASTOLIC BLOOD PRESSURE: 86 MMHG | BODY MASS INDEX: 31.27 KG/M2 | SYSTOLIC BLOOD PRESSURE: 128 MMHG | HEART RATE: 78 BPM | OXYGEN SATURATION: 99 % | WEIGHT: 223.4 LBS | HEIGHT: 71 IN

## 2022-11-08 DIAGNOSIS — R07.89 MID STERNAL CHEST PAIN: ICD-10-CM

## 2022-11-08 DIAGNOSIS — M89.8X1 PAIN OF RIGHT CLAVICLE: ICD-10-CM

## 2022-11-08 DIAGNOSIS — R07.89 RIGHT-SIDED CHEST WALL PAIN: ICD-10-CM

## 2022-11-08 DIAGNOSIS — R07.89 RIGHT-SIDED CHEST WALL PAIN: Primary | ICD-10-CM

## 2022-11-08 LAB
ALBUMIN SERPL-MCNC: 4.7 G/DL (ref 3.5–5.1)
ALP BLD-CCNC: 73 U/L (ref 38–126)
ALT SERPL-CCNC: 26 U/L (ref 11–66)
ANION GAP SERPL CALCULATED.3IONS-SCNC: 12 MEQ/L (ref 8–16)
AST SERPL-CCNC: 26 U/L (ref 5–40)
BASOPHILS # BLD: 0.4 %
BASOPHILS ABSOLUTE: 0 THOU/MM3 (ref 0–0.1)
BILIRUB SERPL-MCNC: 0.7 MG/DL (ref 0.3–1.2)
BUN BLDV-MCNC: 16 MG/DL (ref 7–22)
C-REACTIVE PROTEIN: < 0.3 MG/DL (ref 0–1)
CALCIUM SERPL-MCNC: 9.7 MG/DL (ref 8.5–10.5)
CHLORIDE BLD-SCNC: 104 MEQ/L (ref 98–111)
CO2: 25 MEQ/L (ref 23–33)
CREAT SERPL-MCNC: 0.9 MG/DL (ref 0.4–1.2)
EOSINOPHIL # BLD: 1.3 %
EOSINOPHILS ABSOLUTE: 0.1 THOU/MM3 (ref 0–0.4)
ERYTHROCYTE [DISTWIDTH] IN BLOOD BY AUTOMATED COUNT: 12.2 % (ref 11.5–14.5)
ERYTHROCYTE [DISTWIDTH] IN BLOOD BY AUTOMATED COUNT: 39.9 FL (ref 35–45)
GFR SERPL CREATININE-BSD FRML MDRD: > 60 ML/MIN/1.73M2
GLUCOSE BLD-MCNC: 83 MG/DL (ref 70–108)
HCT VFR BLD CALC: 46.4 % (ref 42–52)
HEMOGLOBIN: 15.9 GM/DL (ref 14–18)
IMMATURE GRANS (ABS): 0.01 THOU/MM3 (ref 0–0.07)
IMMATURE GRANULOCYTES: 0.2 %
LYMPHOCYTES # BLD: 35.3 %
LYMPHOCYTES ABSOLUTE: 1.6 THOU/MM3 (ref 1–4.8)
MCH RBC QN AUTO: 30.5 PG (ref 26–33)
MCHC RBC AUTO-ENTMCNC: 34.3 GM/DL (ref 32.2–35.5)
MCV RBC AUTO: 89.1 FL (ref 80–94)
MONOCYTES # BLD: 9.9 %
MONOCYTES ABSOLUTE: 0.4 THOU/MM3 (ref 0.4–1.3)
NUCLEATED RED BLOOD CELLS: 0 /100 WBC
PLATELET # BLD: 225 THOU/MM3 (ref 130–400)
PMV BLD AUTO: 10.3 FL (ref 9.4–12.4)
POTASSIUM SERPL-SCNC: 4.3 MEQ/L (ref 3.5–5.2)
RBC # BLD: 5.21 MILL/MM3 (ref 4.7–6.1)
RHEUMATOID FACTOR: < 10 IU/ML (ref 0–13)
SEDIMENTATION RATE, ERYTHROCYTE: 5 MM/HR (ref 0–10)
SEG NEUTROPHILS: 52.9 %
SEGMENTED NEUTROPHILS ABSOLUTE COUNT: 2.4 THOU/MM3 (ref 1.8–7.7)
SODIUM BLD-SCNC: 141 MEQ/L (ref 135–145)
TOTAL PROTEIN: 7.2 G/DL (ref 6.1–8)
URIC ACID: 5.6 MG/DL (ref 3.7–7)
WBC # BLD: 4.5 THOU/MM3 (ref 4.8–10.8)

## 2022-11-08 PROCEDURE — 99213 OFFICE O/P EST LOW 20 MIN: CPT | Performed by: NURSE PRACTITIONER

## 2022-11-08 RX ORDER — NAPROXEN 500 MG/1
500 TABLET ORAL 2 TIMES DAILY WITH MEALS
Qty: 60 TABLET | Refills: 3 | Status: SHIPPED | OUTPATIENT
Start: 2022-11-08

## 2022-11-08 RX ORDER — CYCLOBENZAPRINE HCL 10 MG
10 TABLET ORAL 3 TIMES DAILY PRN
Qty: 30 TABLET | Refills: 0 | Status: SHIPPED | OUTPATIENT
Start: 2022-11-08 | End: 2022-11-18

## 2022-11-08 RX ORDER — PREDNISONE 50 MG/1
50 TABLET ORAL DAILY
Qty: 5 TABLET | Refills: 0 | Status: SHIPPED | OUTPATIENT
Start: 2022-11-08 | End: 2022-11-13

## 2022-11-08 SDOH — ECONOMIC STABILITY: FOOD INSECURITY: WITHIN THE PAST 12 MONTHS, YOU WORRIED THAT YOUR FOOD WOULD RUN OUT BEFORE YOU GOT MONEY TO BUY MORE.: NEVER TRUE

## 2022-11-08 SDOH — ECONOMIC STABILITY: FOOD INSECURITY: WITHIN THE PAST 12 MONTHS, THE FOOD YOU BOUGHT JUST DIDN'T LAST AND YOU DIDN'T HAVE MONEY TO GET MORE.: NEVER TRUE

## 2022-11-08 ASSESSMENT — ENCOUNTER SYMPTOMS
SORE THROAT: 0
DIARRHEA: 0
CHEST TIGHTNESS: 0
FACIAL SWELLING: 0
EYE PAIN: 0
ABDOMINAL DISTENTION: 0
BACK PAIN: 0
SHORTNESS OF BREATH: 0
NAUSEA: 0
PHOTOPHOBIA: 0
CONSTIPATION: 0
APNEA: 0
EYE DISCHARGE: 0

## 2022-11-08 ASSESSMENT — PATIENT HEALTH QUESTIONNAIRE - PHQ9
SUM OF ALL RESPONSES TO PHQ QUESTIONS 1-9: 0
1. LITTLE INTEREST OR PLEASURE IN DOING THINGS: 0
SUM OF ALL RESPONSES TO PHQ QUESTIONS 1-9: 0
2. FEELING DOWN, DEPRESSED OR HOPELESS: 0
SUM OF ALL RESPONSES TO PHQ9 QUESTIONS 1 & 2: 0
SUM OF ALL RESPONSES TO PHQ QUESTIONS 1-9: 0
SUM OF ALL RESPONSES TO PHQ QUESTIONS 1-9: 0

## 2022-11-08 ASSESSMENT — SOCIAL DETERMINANTS OF HEALTH (SDOH): HOW HARD IS IT FOR YOU TO PAY FOR THE VERY BASICS LIKE FOOD, HOUSING, MEDICAL CARE, AND HEATING?: NOT HARD AT ALL

## 2022-11-08 NOTE — PROGRESS NOTES
[address]  Dept: [address] [facility] (:  1995) is a 27 y.o. male,Established patient, here for evaluation of the following chief complaint(s):  Chest Pain (Same issue he had two years ago and it never got figured out. He thinks it is now costochondritis.)      ASSESSMENT/PLAN:  I have reviewed the patient's medical history in detail and updated the computerized patient record. HPI/ROS per the patient and caregiver. Overall non toxic in appearance. Answers questions appropriately. Conditions discussed and addressed this visit include:   Here for ongoing chest pain  No known injury  Began occurring after working at [employer] and working out. Seen by [facility] 2 years ago. No conclusive diagnosis. Topical steroid did not help  Oral steroids have helped in the past.   Refer to ortho neuro per pt request  Steroid/nsaid today  Inflammatory labs today. Follow up after seen by Ortho Neuro. 1. Right-sided chest wall pain  -     External Referral To Orthopedic Surgery  -     predniSONE (DELTASONE) 50 MG tablet; Take 1 tablet by mouth daily for 5 days, Disp-5 tablet, R-0Normal  -     naproxen (NAPROSYN) 500 MG tablet; Take 1 tablet by mouth 2 times daily (with meals), Disp-60 tablet, R-3Normal  -     Sedimentation rate, automated; Future  -     Cyclic Citrul Peptide Antibody, IgG; Future  -     Uric Acid; Future  -     C-Reactive Protein; Future  -     Rheumatoid Factor; Future  -     MELLISSA Screen with Reflex; Future  -     Comprehensive Metabolic Panel; Future  -     CBC with Auto Differential; Future  2. Pain of right clavicle  -     External Referral To Orthopedic Surgery  -     predniSONE (DELTASONE) 50 MG tablet; Take 1 tablet by mouth daily for 5 days, Disp-5 tablet, R-0Normal  -     naproxen (NAPROSYN) 500 MG tablet; Take 1 tablet by mouth 2 times daily (with meals), Disp-60 tablet, R-3Normal  -     Sedimentation rate, automated;  Future  - Cyclic Citrul Peptide Antibody, IgG; Future  -     Uric Acid; Future  -     C-Reactive Protein; Future  -     Rheumatoid Factor; Future  -     MELLISSA Screen with Reflex; Future  -     Comprehensive Metabolic Panel; Future  -     CBC with Auto Differential; Future  3. Mid sternal chest pain  -     External Referral To Orthopedic Surgery  -     predniSONE (DELTASONE) 50 MG tablet; Take 1 tablet by mouth daily for 5 days, Disp-5 tablet, R-0Normal  -     naproxen (NAPROSYN) 500 MG tablet; Take 1 tablet by mouth 2 times daily (with meals), Disp-60 tablet, R-3Normal  -     Sedimentation rate, automated; Future  -     Cyclic Citrul Peptide Antibody, IgG; Future  -     Uric Acid; Future  -     C-Reactive Protein; Future  -     Rheumatoid Factor; Future  -     MELLISSA Screen with Reflex; Future  -     Comprehensive Metabolic Panel; Future  -     CBC with Auto Differential; Future    Return in about 4 weeks (around 12/6/2022) for Results review. SUBJECTIVE/OBJECTIVE:  HPI  Here for ongoing chest pain  Sternal to right rib cage  Had been to [facility] and to PT and this had not helped  Continues to work at [employer] to work out  The pain is sharp, shooting, stabbing at times. Centered in the sternum and radiates to the right 2nd and 3rd rib cage, over to right shoulder at times  Can last minutes to several days    Review of Systems   Constitutional:  Negative for activity change, appetite change, diaphoresis, fatigue and fever. HENT:  Negative for congestion, facial swelling, postnasal drip and sore throat. Eyes:  Negative for photophobia, pain, discharge and visual disturbance. Respiratory:  Negative for apnea, chest tightness and shortness of breath. Cardiovascular:  Positive for chest pain. Negative for palpitations and leg swelling. Gastrointestinal:  Negative for abdominal distention, constipation, diarrhea and nausea. Endocrine: Negative for cold intolerance.    Genitourinary:  Negative for dysuria, flank pain, frequency and urgency. Musculoskeletal:  Positive for arthralgias and myalgias. Negative for back pain, neck pain and neck stiffness. Skin:  Negative for pallor and rash. Allergic/Immunologic: Negative for environmental allergies and food allergies. Neurological:  Negative for dizziness, weakness, numbness and headaches. Hematological:  Negative for adenopathy. Does not bruise/bleed easily. Psychiatric/Behavioral:  Negative for agitation, confusion and suicidal ideas. Physical Exam  Vitals and nursing note reviewed. Constitutional:       General: He is not in acute distress. Appearance: Normal appearance. He is well-developed and normal weight. He is not ill-appearing. HENT:      Head: Normocephalic. Right Ear: Tympanic membrane, ear canal and external ear normal.      Left Ear: Tympanic membrane, ear canal and external ear normal.      Nose: Nose normal.      Mouth/Throat:      Mouth: Mucous membranes are moist.      Pharynx: No posterior oropharyngeal erythema. Eyes:      General: No scleral icterus. Right eye: No discharge. Left eye: No discharge. Conjunctiva/sclera: Conjunctivae normal.   Cardiovascular:      Rate and Rhythm: Normal rate and regular rhythm. Pulses: Normal pulses. Heart sounds: Normal heart sounds. Pulmonary:      Effort: Pulmonary effort is normal.      Breath sounds: Normal breath sounds. No wheezing or rales. Chest:      Chest wall: Tenderness present. No mass, deformity, crepitus or edema. There is no dullness to percussion. Breasts:     Breasts are symmetrical.       Abdominal:      General: Bowel sounds are normal. There is no distension. Palpations: Abdomen is soft. Tenderness: There is no abdominal tenderness. Musculoskeletal:         General: No tenderness. Normal range of motion. Cervical back: Normal range of motion and neck supple. No muscular tenderness.    Lymphadenopathy:      Cervical: No cervical adenopathy. Skin:     General: Skin is warm and dry. Capillary Refill: Capillary refill takes less than 2 seconds. Findings: No erythema or rash. Neurological:      General: No focal deficit present. Mental Status: He is alert and oriented to person, place, and time. Mental status is at baseline. Psychiatric:         Behavior: Behavior normal.         Thought Content: Thought content normal.         Judgment: Judgment normal.               An electronic signature was used to authenticate this note.     --JULIET Haddad - CNP

## 2022-11-10 LAB — CYCLIC CITRULLINATED PEPTIDE ANTIBODY IGG: 0.8 U/ML (ref 0–7)

## 2022-11-11 LAB — ANA SCREEN: NORMAL

## 2022-11-14 ENCOUNTER — TELEPHONE (OUTPATIENT)
Dept: FAMILY MEDICINE CLINIC | Age: 27
End: 2022-11-14

## 2022-11-14 NOTE — TELEPHONE ENCOUNTER
I copy and pasted Devika's response regarding the patient's lab results and sent them to the patient via My Chart.

## 2022-11-14 NOTE — TELEPHONE ENCOUNTER
----- Message from JULIET Morillo CNP sent at 11/11/2022  9:35 AM EST -----  Labs within normal limits. No evidence for inflammatory or rheumatic disease. Continue on with referral to Ortho Neuro.

## 2023-01-30 ENCOUNTER — NURSE ONLY (OUTPATIENT)
Dept: LAB | Age: 28
End: 2023-01-30

## 2023-01-30 ENCOUNTER — OFFICE VISIT (OUTPATIENT)
Dept: FAMILY MEDICINE CLINIC | Age: 28
End: 2023-01-30
Payer: COMMERCIAL

## 2023-01-30 VITALS
BODY MASS INDEX: 32.7 KG/M2 | TEMPERATURE: 97.8 F | HEART RATE: 94 BPM | DIASTOLIC BLOOD PRESSURE: 80 MMHG | SYSTOLIC BLOOD PRESSURE: 120 MMHG | OXYGEN SATURATION: 99 % | WEIGHT: 231.8 LBS

## 2023-01-30 DIAGNOSIS — N50.812 PAIN IN BOTH TESTICLES: Primary | ICD-10-CM

## 2023-01-30 DIAGNOSIS — N50.811 PAIN IN BOTH TESTICLES: Primary | ICD-10-CM

## 2023-01-30 LAB
BILIRUBIN, POC: NEGATIVE
BLOOD URINE, POC: NEGATIVE
CLARITY, POC: CLEAR
COLOR, POC: NORMAL
GLUCOSE URINE, POC: NEGATIVE
KETONES, POC: NEGATIVE
LEUKOCYTE EST, POC: NEGATIVE
NITRITE, POC: NEGATIVE
PH, POC: 7
PROTEIN, POC: NEGATIVE
SPECIFIC GRAVITY, POC: 1.02
UROBILINOGEN, POC: 0.2

## 2023-01-30 PROCEDURE — 99214 OFFICE O/P EST MOD 30 MIN: CPT | Performed by: NURSE PRACTITIONER

## 2023-01-30 PROCEDURE — 81003 URINALYSIS AUTO W/O SCOPE: CPT | Performed by: NURSE PRACTITIONER

## 2023-01-30 RX ORDER — AMOXICILLIN AND CLAVULANATE POTASSIUM 875; 125 MG/1; MG/1
1 TABLET, FILM COATED ORAL 2 TIMES DAILY WITH MEALS
Qty: 20 TABLET | Refills: 0 | Status: SHIPPED | OUTPATIENT
Start: 2023-01-30 | End: 2023-02-09

## 2023-01-30 ASSESSMENT — PATIENT HEALTH QUESTIONNAIRE - PHQ9
SUM OF ALL RESPONSES TO PHQ QUESTIONS 1-9: 0
2. FEELING DOWN, DEPRESSED OR HOPELESS: 0
SUM OF ALL RESPONSES TO PHQ QUESTIONS 1-9: 0
1. LITTLE INTEREST OR PLEASURE IN DOING THINGS: 0
SUM OF ALL RESPONSES TO PHQ QUESTIONS 1-9: 0
SUM OF ALL RESPONSES TO PHQ9 QUESTIONS 1 & 2: 0
SUM OF ALL RESPONSES TO PHQ QUESTIONS 1-9: 0

## 2023-01-30 ASSESSMENT — ENCOUNTER SYMPTOMS
SHORTNESS OF BREATH: 0
CHEST TIGHTNESS: 0
COUGH: 0

## 2023-01-30 NOTE — PROGRESS NOTES
[address]  Dept: [address] [facility] (:  1995) is a 24 y.o. male,Established patient, here for evaluation of the following chief complaint(s):  Testicle Pain (X 2 weeks.)      ASSESSMENT/PLAN:  I have reviewed the patient's medical history in detail and updated the computerized patient record. HPI/ROS per the patient and caregiver. Overall non toxic in appearance. Answers questions appropriately. Conditions discussed and addressed this visit include:   Here for bilateral testicular pain. Right testes> left  No epididymal swelling or distortion  No new partner    Start on augmentin  US of scrotum     1. Pain in both testicles  -     POCT Urinalysis No Micro (Auto)  -     amoxicillin-clavulanate (AUGMENTIN) 875-125 MG per tablet; Take 1 tablet by mouth 2 times daily (with meals) for 10 days, Disp-20 tablet, R-0Normal  -     US SCROTUM AND TESTICLES; Future    Return if symptoms worsen or fail to improve, for Routine follow up. SUBJECTIVE/OBJECTIVE:  HPI  Here for left sided testicle pain  Has been ongoing for 2 weeks  No discharge or drainage  No swelling that he is aware of  No injury, strain or previous surgery  Hurts to cough or sneeze  No issues with ejaculation    Review of Systems   Constitutional:  Negative for activity change and appetite change. Respiratory:  Negative for cough, chest tightness and shortness of breath. Cardiovascular:  Negative for chest pain and leg swelling. Genitourinary:  Positive for scrotal swelling and testicular pain. Negative for decreased urine volume, difficulty urinating, dysuria, frequency and urgency. Musculoskeletal: Negative. Hematological:  Negative for adenopathy. Psychiatric/Behavioral: Negative. Physical Exam  Vitals and nursing note reviewed. Constitutional:       Appearance: Normal appearance. He is not ill-appearing. HENT:      Head: Normocephalic.       Right Ear: External ear normal.      Left Ear: External ear normal.      Nose: Nose normal.      Mouth/Throat:      Mouth: Mucous membranes are moist.   Cardiovascular:      Rate and Rhythm: Normal rate and regular rhythm. Pulses: Normal pulses. Pulmonary:      Effort: Pulmonary effort is normal.   Abdominal:      General: Abdomen is flat. Bowel sounds are normal.      Palpations: Abdomen is soft. Hernia: There is no hernia in the left inguinal area or right inguinal area. Genitourinary:     Pubic Area: No rash or pubic lice. Penis: Uncircumcised. No phimosis, paraphimosis, hypospadias, erythema, discharge or swelling. Testes: Cremasteric reflex is present. Right: Tenderness and swelling present. Mass, testicular hydrocele or varicocele not present. Left: Tenderness present. Mass, testicular hydrocele or varicocele not present. Epididymis:      Right: Not inflamed or enlarged. No mass. Left: Not inflamed or enlarged. No mass. Musculoskeletal:         General: Normal range of motion. Cervical back: Normal range of motion and neck supple. No tenderness. Lymphadenopathy:      Cervical: No cervical adenopathy. Lower Body: No right inguinal adenopathy. No left inguinal adenopathy. Skin:     General: Skin is warm and dry. Capillary Refill: Capillary refill takes less than 2 seconds. Coloration: Skin is not pale. Neurological:      General: No focal deficit present. Mental Status: He is alert and oriented to person, place, and time. Mental status is at baseline. Psychiatric:         Mood and Affect: Mood normal.         Behavior: Behavior normal.         Thought Content: Thought content normal.               An electronic signature was used to authenticate this note.     --Kayleigh Garrett, JULIET - CNP

## 2023-01-31 LAB
CHLAMYDIA TRACHOMATIS BY RT-PCR: NOT DETECTED
CT/NG SOURCE: NORMAL
NEISSERIA GONORRHOEAE BY RT-PCR: NOT DETECTED

## 2023-02-03 ENCOUNTER — HOSPITAL ENCOUNTER (OUTPATIENT)
Dept: ULTRASOUND IMAGING | Age: 28
Discharge: HOME | End: 2023-02-03
Payer: COMMERCIAL

## 2023-02-03 DIAGNOSIS — N50.811 PAIN IN BOTH TESTICLES: ICD-10-CM

## 2023-02-03 DIAGNOSIS — N50.812 PAIN IN BOTH TESTICLES: ICD-10-CM

## 2023-02-03 PROCEDURE — 76870 US EXAM SCROTUM: CPT

## 2023-02-06 ENCOUNTER — TELEPHONE (OUTPATIENT)
Dept: FAMILY MEDICINE CLINIC | Age: 28
End: 2023-02-06

## 2023-02-06 NOTE — TELEPHONE ENCOUNTER
----- Message from JULIET Cruz CNP sent at 2/6/2023  9:05 AM EST -----  US of scrotum fairly normal. No masses noted. Small hydroceles noted and left varicocele. This can be coming from his lifting. Needs to be wearing a supportive garment when lifting.  If no improvement in pain, will make urology referral.

## 2023-02-06 NOTE — TELEPHONE ENCOUNTER
I copy and pasted Devika's response regarding the patient's US Scrotum results and sent them to the patient via My Chart.

## 2024-01-19 DIAGNOSIS — N50.811 PAIN IN BOTH TESTICLES: Primary | ICD-10-CM

## 2024-01-19 DIAGNOSIS — N50.812 PAIN IN BOTH TESTICLES: Primary | ICD-10-CM

## 2024-01-22 ENCOUNTER — TELEPHONE (OUTPATIENT)
Dept: FAMILY MEDICINE CLINIC | Age: 29
End: 2024-01-22

## 2024-01-22 ENCOUNTER — NURSE ONLY (OUTPATIENT)
Dept: FAMILY MEDICINE CLINIC | Age: 29
End: 2024-01-22
Payer: COMMERCIAL

## 2024-01-22 DIAGNOSIS — N50.812 PAIN IN BOTH TESTICLES: Primary | ICD-10-CM

## 2024-01-22 DIAGNOSIS — N50.811 PAIN IN BOTH TESTICLES: Primary | ICD-10-CM

## 2024-01-22 LAB
BILIRUBIN, POC: NEGATIVE
BLOOD URINE, POC: NEGATIVE
CLARITY, POC: CLEAR
COLOR, POC: NORMAL
GLUCOSE URINE, POC: NEGATIVE
KETONES, POC: NEGATIVE
LEUKOCYTE EST, POC: NEGATIVE
NITRITE, POC: NEGATIVE
PH, POC: 5.5
PROTEIN, POC: NEGATIVE
SPECIFIC GRAVITY, POC: >=1.03
UROBILINOGEN, POC: NORMAL

## 2024-01-22 PROCEDURE — 81003 URINALYSIS AUTO W/O SCOPE: CPT | Performed by: NURSE PRACTITIONER

## 2024-01-22 NOTE — PROGRESS NOTES
[address] [place] [id number]  Dept: [phone]        Pt urine without signs of infection. No need for culture.     An electronic signature was used to authenticate this note.    --JULIET Simpson - CNP

## 2024-01-22 NOTE — TELEPHONE ENCOUNTER
----- Message from JULIET Simpson - CNP sent at 1/22/2024  8:50 AM EST -----  UA wnl. Continue on with US and referral to urology.